# Patient Record
Sex: MALE | Race: WHITE | NOT HISPANIC OR LATINO | Employment: OTHER | URBAN - METROPOLITAN AREA
[De-identification: names, ages, dates, MRNs, and addresses within clinical notes are randomized per-mention and may not be internally consistent; named-entity substitution may affect disease eponyms.]

---

## 2017-04-13 PROBLEM — K61.1 PERIRECTAL ABSCESS: Status: ACTIVE | Noted: 2017-04-13

## 2017-04-14 PROBLEM — K61.2 ABSCESS OF ANAL AND RECTAL REGIONS: Status: ACTIVE | Noted: 2017-04-13

## 2017-04-14 PROBLEM — K61.1 PERIRECTAL ABSCESS: Status: RESOLVED | Noted: 2017-04-13 | Resolved: 2017-04-14

## 2017-04-19 PROBLEM — K59.00 CONSTIPATION: Chronic | Status: ACTIVE | Noted: 2017-04-19

## 2017-07-20 PROBLEM — K60.4 RECTAL FISTULA: Status: ACTIVE | Noted: 2017-07-20

## 2017-07-24 PROBLEM — K64.9 HEMORRHOIDS: Status: ACTIVE | Noted: 2017-07-24

## 2019-08-15 PROBLEM — K62.5 ANAL BLEEDING: Status: ACTIVE | Noted: 2019-08-15

## 2022-11-23 ENCOUNTER — TELEPHONE (OUTPATIENT)
Dept: SURGERY | Facility: CLINIC | Age: 72
End: 2022-11-23
Payer: COMMERCIAL

## 2022-11-23 NOTE — TELEPHONE ENCOUNTER
Called pt to let him know that he can fill out any necessary paperwork when he arrives for his appt.on 12/15.  Pt verbalized understanding to all.

## 2022-12-15 ENCOUNTER — OFFICE VISIT (OUTPATIENT)
Dept: SURGERY | Facility: CLINIC | Age: 72
End: 2022-12-15
Payer: MEDICARE

## 2022-12-15 VITALS — HEIGHT: 66 IN | WEIGHT: 197 LBS | BODY MASS INDEX: 31.66 KG/M2

## 2022-12-15 DIAGNOSIS — K64.5 EXTERNAL HEMORRHOID, THROMBOSED: ICD-10-CM

## 2022-12-15 DIAGNOSIS — K60.2 ANAL FISSURE: ICD-10-CM

## 2022-12-15 DIAGNOSIS — K60.3 ANAL FISTULA: Primary | ICD-10-CM

## 2022-12-15 DIAGNOSIS — K64.4 HEMORRHOIDAL SKIN TAGS: ICD-10-CM

## 2022-12-15 PROCEDURE — 99999 PR PBB SHADOW E&M-EST. PATIENT-LVL III: CPT | Mod: PBBFAC,,, | Performed by: COLON & RECTAL SURGERY

## 2022-12-15 PROCEDURE — 1101F PR PT FALLS ASSESS DOC 0-1 FALLS W/OUT INJ PAST YR: ICD-10-PCS | Mod: CPTII,S$GLB,, | Performed by: COLON & RECTAL SURGERY

## 2022-12-15 PROCEDURE — 4010F PR ACE/ARB THEARPY RXD/TAKEN: ICD-10-PCS | Mod: CPTII,S$GLB,, | Performed by: COLON & RECTAL SURGERY

## 2022-12-15 PROCEDURE — 4010F ACE/ARB THERAPY RXD/TAKEN: CPT | Mod: CPTII,S$GLB,, | Performed by: COLON & RECTAL SURGERY

## 2022-12-15 PROCEDURE — 1160F PR REVIEW ALL MEDS BY PRESCRIBER/CLIN PHARMACIST DOCUMENTED: ICD-10-PCS | Mod: CPTII,S$GLB,, | Performed by: COLON & RECTAL SURGERY

## 2022-12-15 PROCEDURE — 1126F AMNT PAIN NOTED NONE PRSNT: CPT | Mod: CPTII,S$GLB,, | Performed by: COLON & RECTAL SURGERY

## 2022-12-15 PROCEDURE — 3008F BODY MASS INDEX DOCD: CPT | Mod: CPTII,S$GLB,, | Performed by: COLON & RECTAL SURGERY

## 2022-12-15 PROCEDURE — 1126F PR PAIN SEVERITY QUANTIFIED, NO PAIN PRESENT: ICD-10-PCS | Mod: CPTII,S$GLB,, | Performed by: COLON & RECTAL SURGERY

## 2022-12-15 PROCEDURE — 1159F PR MEDICATION LIST DOCUMENTED IN MEDICAL RECORD: ICD-10-PCS | Mod: CPTII,S$GLB,, | Performed by: COLON & RECTAL SURGERY

## 2022-12-15 PROCEDURE — 3288F PR FALLS RISK ASSESSMENT DOCUMENTED: ICD-10-PCS | Mod: CPTII,S$GLB,, | Performed by: COLON & RECTAL SURGERY

## 2022-12-15 PROCEDURE — 3008F PR BODY MASS INDEX (BMI) DOCUMENTED: ICD-10-PCS | Mod: CPTII,S$GLB,, | Performed by: COLON & RECTAL SURGERY

## 2022-12-15 PROCEDURE — 99999 PR PBB SHADOW E&M-EST. PATIENT-LVL III: ICD-10-PCS | Mod: PBBFAC,,, | Performed by: COLON & RECTAL SURGERY

## 2022-12-15 PROCEDURE — 1159F MED LIST DOCD IN RCRD: CPT | Mod: CPTII,S$GLB,, | Performed by: COLON & RECTAL SURGERY

## 2022-12-15 PROCEDURE — 99202 OFFICE O/P NEW SF 15 MIN: CPT | Mod: S$GLB,,, | Performed by: COLON & RECTAL SURGERY

## 2022-12-15 PROCEDURE — 3288F FALL RISK ASSESSMENT DOCD: CPT | Mod: CPTII,S$GLB,, | Performed by: COLON & RECTAL SURGERY

## 2022-12-15 PROCEDURE — 99202 PR OFFICE/OUTPT VISIT, NEW, LEVL II, 15-29 MIN: ICD-10-PCS | Mod: S$GLB,,, | Performed by: COLON & RECTAL SURGERY

## 2022-12-15 PROCEDURE — 1160F RVW MEDS BY RX/DR IN RCRD: CPT | Mod: CPTII,S$GLB,, | Performed by: COLON & RECTAL SURGERY

## 2022-12-15 PROCEDURE — 1101F PT FALLS ASSESS-DOCD LE1/YR: CPT | Mod: CPTII,S$GLB,, | Performed by: COLON & RECTAL SURGERY

## 2022-12-15 RX ORDER — IRBESARTAN 300 MG/1
300 TABLET ORAL
COMMUNITY
Start: 2022-10-05

## 2022-12-15 RX ORDER — HYDROCORTISONE 25 MG/G
CREAM TOPICAL 2 TIMES DAILY
COMMUNITY
Start: 2022-11-01

## 2022-12-15 RX ORDER — BISOPROLOL FUMARATE 10 MG/1
10 TABLET, FILM COATED ORAL
COMMUNITY
Start: 2022-11-12

## 2022-12-15 RX ORDER — ROSUVASTATIN CALCIUM 10 MG/1
5 TABLET, COATED ORAL
COMMUNITY
Start: 2022-10-03

## 2022-12-15 RX ORDER — OXYBUTYNIN CHLORIDE 5 MG/1
5 TABLET ORAL NIGHTLY
COMMUNITY
Start: 2022-09-01

## 2022-12-15 NOTE — LETTER
December 15, 2022      Melvin Strickland MD  [address] [place] [address] [id number] [place] [address] - Colon/Rectal Surg  [facility] [address]  Phone: [phone]          Patient: Junito Cervantes   MR Number: [id number]   YOB: 1950   Date of Visit: 12/15/2022       Dear Dr Strickland:    Thank you for referring Junito Cervantes to me for evaluation. Attached you will find relevant portions of my assessment and plan of care.    If you have questions, please do not hesitate to call me. I look forward to following Junito Cervantes along with you.    Sincerely,    Melvin Kowalski MD    Enclosure  CC:  No Recipients    If you would like to receive this communication electronically, please contact [email or web address] or [phone] to request more information on [facility] access.    For providers and/or their staff who would like to refer a patient to [facility], please contact us through our one-stop-shop provider referral line, [facility], at [phone].    If you feel you have received this communication in error or would no longer like to receive these types of communications, please e-mail [email or web address]

## 2022-12-15 NOTE — PROGRESS NOTES
"Subjective:       Patient ID: Junito Cervantes is a 72 y.o. male.    Chief Complaint: Hemorrhoids    HPI 73 yo M with c/o "hemorrhoids."  He states he's had longstanding issues with hemorrhoids - itching & irritation.  In early Nov be developed a "cutting"-like pain with BM's - saw PCP who started him on creams and suppositories - pain has improved somewhat but says hemorrhoids are still "out."  No rectal bleeding.  He does have mild fecal seepage, has to wear a pad in his underwear to prevent soiling.  No true fecal incontinence.  No trouble with constipation.    +Hx of prior anal abscess that required what he describes as a significant surgical drainage procedure - this developed into a fistula - underwent anal fistulotomy by Dr Feliciano in 2019. Patient states he has had ongoing drainage since then, feels that the fistula never healed.     Last colonoscopy - 10 years ago, Cologuard 1 year ago was normal (per patient)    No family hx of CRC or IBD.      Review of patient's allergies indicates:   Allergen Reactions    Penicillins Other (See Comments)     WAS TOLD HAD A REACTION AS A CHILD       Past Medical History:   Diagnosis Date    Arthritis     Constipation     Hypercholesteremia     Hypertension     Lazy eye of left side     TOXOPLASMOSIS    Rectal fistula 2019    Seizures     EPILEPSY FROM AGE 3-11 YEARS OF AGE       Past Surgical History:   Procedure Laterality Date    COLONOSCOPY      DIGITAL RECTAL EXAMINATION UNDER ANESTHESIA N/A 8/15/2019    Procedure: EXAM UNDER ANESTHESIA, DIGITAL, RECTUM;  Surgeon: Ed Feliciano MD;  Location: [facility];  Service: General;  Laterality: N/A;    ELBOW SPUR      EXCISION OF HYDROCELE      KNEE ARTHROSCOPY Bilateral     PERIRECTAL ABCESS         Current Outpatient Medications   Medication Sig Dispense Refill    bisoprolol (ZEBETA) 10 MG tablet Take 10 mg by mouth.      doxazosin (CARDURA) 4 MG tablet Take 4 mg by mouth once daily.      finasteride (PROSCAR) 5 mg tablet Take " 5 mg by mouth once daily.      oxybutynin (DITROPAN) 5 MG Tab Take 5 mg by mouth every evening.      rosuvastatin (CRESTOR) 10 MG tablet Take 5 mg by mouth.      aspirin (ECOTRIN) 81 MG EC tablet Take 81 mg by mouth once daily.      cyanocobalamin (VITAMIN B-12) 1000 MCG tablet Take 100 mcg by mouth once daily.      diclofenac (CATAFLAM) 50 MG tablet Take 50 mg by mouth once daily.       HYDROcodone-acetaminophen (NORCO) 7.5-325 mg per tablet Take 1 tablet by mouth every 6 (six) hours as needed for Pain.      hydrocortisone (ANUSOL-HC) 2.5 % rectal cream 2 (two) times daily.      irbesartan (AVAPRO) 300 MG tablet Take 300 mg by mouth.      lisinopril (PRINIVIL,ZESTRIL) 20 MG tablet Take 20 mg by mouth 2 (two) times daily.       pravastatin (PRAVACHOL) 20 MG tablet Take 20 mg by mouth once daily.      senna-docusate 8.6-50 mg (PERICOLACE) 8.6-50 mg per tablet Take 1 tablet by mouth nightly.        No current facility-administered medications for this visit.       Family History   Problem Relation Age of Onset    Cancer Mother     Aneurysm Father     Hypertension Father        Social History     Socioeconomic History    Marital status:    Tobacco Use    Smoking status: Never    Smokeless tobacco: Never   Substance and Sexual Activity    Alcohol use: No    Drug use: No       Review of Systems   Constitutional:  Negative for chills and fever.   HENT:  Negative for congestion and sinus pressure.    Eyes:  Negative for visual disturbance.   Respiratory:  Negative for cough and shortness of breath.    Cardiovascular:  Negative for chest pain and palpitations.   Gastrointestinal:  Positive for rectal pain. Negative for abdominal distention, abdominal pain, anal bleeding, blood in stool, constipation, diarrhea, nausea and vomiting.        Fecal seepage   Endocrine: Negative for cold intolerance and heat intolerance.   Genitourinary:  Negative for dysuria and frequency.   Musculoskeletal:  Positive for arthralgias.  Negative for back pain.   Skin:  Negative for rash.   Allergic/Immunologic: Negative for immunocompromised state.   Neurological:  Negative for dizziness, light-headedness and headaches.   Psychiatric/Behavioral:  Negative for confusion. The patient is not nervous/anxious.      Objective:      Physical Exam  Constitutional:       Appearance: He is well-developed.   HENT:      Head: Normocephalic and atraumatic.   Eyes:      Conjunctiva/sclera: Conjunctivae normal.   Pulmonary:      Effort: Pulmonary effort is normal. No respiratory distress.   Abdominal:      General: There is no distension.      Palpations: Abdomen is soft. There is no mass.      Tenderness: There is no abdominal tenderness. There is no guarding or rebound.   Genitourinary:     Comments: Perineum - normal perianal skin, no mass, no fissure, +resolving thrombosed external hemorrhoid LL, large external hemorrhoidal skin tag RAL, fistulotomy scar JUAN position with residual external fistula opening, +AML fissure  QUENTIN - good tone, weak squeeze    Skin:     General: Skin is warm and dry.      Findings: No erythema.   Neurological:      Mental Status: He is alert and oriented to person, place, and time.         Lab Results   Component Value Date    WBC 11.10 04/18/2017    HGB 19.3 (H) 04/18/2017    HCT 56.3 (H) 04/18/2017    MCV 88 04/18/2017     04/18/2017     BMP  Lab Results   Component Value Date     04/18/2017    K 4.2 04/18/2017     04/18/2017    CO2 26 04/18/2017    BUN 16 04/18/2017    CREATININE 1.00 04/18/2017    CALCIUM 8.5 04/18/2017    ESTGFRAFRICA >60 04/18/2017    EGFRNONAA >60 04/18/2017     CMP  Sodium   Date Value Ref Range Status   04/18/2017 136 136 - 145 mmol/L Final     Potassium   Date Value Ref Range Status   04/18/2017 4.2 3.5 - 5.1 mmol/L Final     Chloride   Date Value Ref Range Status   04/18/2017 101 95 - 110 mmol/L Final     CO2   Date Value Ref Range Status   04/18/2017 26 23 - 29 mmol/L Final     Glucose    Date Value Ref Range Status   04/18/2017 105 74 - 106 mg/dL Final     BUN   Date Value Ref Range Status   04/18/2017 16 9 - 20 mg/dL Final     Creatinine   Date Value Ref Range Status   04/18/2017 1.00 0.80 - 1.50 mg/dL Final     Calcium   Date Value Ref Range Status   04/18/2017 8.5 8.4 - 10.2 mg/dL Final     Total Protein   Date Value Ref Range Status   04/14/2017 8.0 6.3 - 8.2 g/dL Final     Albumin   Date Value Ref Range Status   04/14/2017 3.9 3.5 - 5.2 g/dL Final     Total Bilirubin   Date Value Ref Range Status   04/14/2017 1.0 0.2 - 1.3 mg/dL Final     Alkaline Phosphatase   Date Value Ref Range Status   04/14/2017 115 38 - 145 U/L Final     AST   Date Value Ref Range Status   04/14/2017 32 17 - 59 U/L Final     ALT   Date Value Ref Range Status   04/14/2017 45 (H) 10 - 44 U/L Final     eGFR if    Date Value Ref Range Status   04/18/2017 >60 >60 mL/min/1.73 m^2 Final     eGFR if non    Date Value Ref Range Status   04/18/2017 >60 >60 mL/min/1.73 m^2 Final     Comment:     Calculation used to obtain the estimated glomerular filtration  rate (eGFR) is the CKD-EPI equation. Since race is unknown   in our information system, the eGFR values for   -American and Non--American patients are given   for each creatinine result.       No results found for: CEA        Assessment:       1. Anal fistula    2. Anal fissure    3. External hemorrhoid, thrombosed    4. Hemorrhoidal skin tags        Plan:   I reassured him that his hemorrhoids did not require surgical attention.  The thrombosed external hemorrhoid on the left side is soft, nontender, and appears to be resolving.  The hemorrhoidal skin tag in the RAL position appears to be improving with the use of topical hydrocortisone, and I would not recommend surgical excision.  The main issue seems to be his persistent anal fistula, which is where I suspect the fecal drainage is coming from.  He does have an anterior midline  anal fissure, which I suspect may be related to the internal opening from the fistula.  I offered to schedule him for examination under anesthesia and possibly provide further surgical management for the fistula, but he declined at the present time, states that he is content with his current state of being.  I offered to see him back in the future should he wish to have the fistula dressed, or if he becomes more symptomatic.  I recommended continued use of daily fiber supplements, Colace as needed, and a high-fiber diet.  He can continue to use topical hydrocortisone cream as needed for his external hemorrhoidal disease.    Melvin Kowalski MD, FACS, FASCRS  Senior Staff Surgeon  Department of Colon & Rectal Surgery     This note was created using voice recognition software, and may contain some unrecognized transcriptional errors.